# Patient Record
Sex: FEMALE | Race: WHITE
[De-identification: names, ages, dates, MRNs, and addresses within clinical notes are randomized per-mention and may not be internally consistent; named-entity substitution may affect disease eponyms.]

---

## 2017-12-20 ENCOUNTER — HOSPITAL ENCOUNTER (EMERGENCY)
Dept: HOSPITAL 42 - ED | Age: 72
Discharge: HOME | End: 2017-12-20
Payer: COMMERCIAL

## 2017-12-20 VITALS
HEART RATE: 69 BPM | RESPIRATION RATE: 18 BRPM | SYSTOLIC BLOOD PRESSURE: 153 MMHG | OXYGEN SATURATION: 98 % | DIASTOLIC BLOOD PRESSURE: 78 MMHG

## 2017-12-20 VITALS — TEMPERATURE: 98.1 F

## 2017-12-20 DIAGNOSIS — H57.8: Primary | ICD-10-CM

## 2017-12-20 NOTE — ED PDOC
Arrival/HPI





- General


Chief Complaint: Headache


Time Seen by Provider: 12/20/17 11:08


Historian: Patient, Family, Other (son translating )





- History of Present Illness


Narrative History of Present Illness (Text): 


you were here with your  and son and have a history of catarct surgery 

in both your eyes about 1 year ago, were treated in the ED today for pressure 

in both eyes and change in vision but otherwise without any pain or foreign 

body sensation in your eyes/nausea/vomiting/headache/dizziness/difficulty 

breathing/chest pain/abdomen pain/numbness/tingling/loss of limb function/pain 

with urination. 


12/20/17 12:15








Past Medical History





- Provider Review


Nursing Documentation Reviewed: Yes





- Travel History


Have you recently traveled outside [place] w/in the past 3 mons?: Yes





- Infectious Disease


Hx of Infectious Diseases: None





- Reproductive


Menopause: Yes





- Cardiac


Hx Hypertension: Yes





- Endocrine/Metabolic


Hx Diabetes Mellitus Type 1: Yes





- Psychiatric


Hx Substance Use: No





Family/Social History





- Physician Review


Nursing Documentation Reviewed: Yes


Family/Social History: No Known Family HX


Smoking Status: Never Smoked


Hx Alcohol Use: No


Hx Substance Use: No





Allergies/Home Meds


Allergies/Adverse Reactions: 


Allergies





No Known Allergies Allergy (Verified 10/07/17 14:34)


 








Home Medications: 


 Home Meds











 Medication  Instructions  Recorded  Confirmed


 


Insulin NPH Hum/Reg Insulin Hm 32 units SC ACB 10/07/17 12/20/17





[Humulin 70/30 70 U/ml-30 U/ml 10   





ml]   














Review of Systems





- Review of Systems


Constitutional: Normal


Eyes: Vision Changes, Photophobia, Other (pressure)


ENT: Normal


Respiratory: Normal


Cardiovascular: Normal


Gastrointestinal: Normal


Genitourinary Female: Normal


Musculoskeletal: Normal


Skin: Normal


Neurological: Normal


Endocrine: Normal


Hemo/Lymphatic: Normal


Psychiatric: Normal





Physical Exam


Vital Signs Reviewed: Yes





Vital Signs











  Temp Pulse Resp BP Pulse Ox


 


 12/20/17 11:14  98.1 F  72  16  155/82 H  97











Temperature: Afebrile


Blood Pressure: Hypertensive


Pulse: Regular


Respiratory Rate: Normal


Appearance: Positive for: Well-Appearing, Non-Toxic, Comfortable


Pain Distress: None


Mental Status: Positive for: Alert and Oriented X 3





- Systems Exam


Head: Present: Atraumatic, Normocephalic


Pupils: Present: PERRL, Other (b/l visual acuity 20/30, magnification without 

foriegn body, b/l pink eyelid, tonometry right 2, 4, 6, and right 1, 3, 4)


Extroacular Muscles: Present: EOMI


Conjunctiva: Present: Normal


Ears: Present: Normal


Mouth: Present: Moist Mucous Membranes


Pharnyx: Present: Normal


Nose (External): Present: Atraumatic


Nose (Internal): Present: Normal Inspection


Respiratory/Chest: Present: Clear to Auscultation, Good Air Exchange


Cardiovascular: Present: Regular Rate and Rhythm


Abdomen: No: Tenderness, Distention, Normal Bowel Sounds, Peritoneal Signs, 

Rebound, Guarding, McBurney's Point Tender, Rovsing's Sign Present, Hernias, 

Feeding Tubes, Ostomy Tubes, Mass/Organomegaly, Scars, Other


Back: Present: Normal Inspection


Upper Extremity: Present: Normal Inspection


Lower Extremity: Present: Normal Inspection


Neurological: Present: GCS=15, CN II-XII Intact, Speech Normal, Motor Func 

Grossly Intact, Gait Normal


Skin: Present: Warm, Normal Color


Psychiatric: Present: Alert, Oriented x 3, Normal Insight, Normal Concentration





Medical Decision Making


ED Course and Treatment: 


you were here with your  and son and have a history of catarct surgery 

in both your eyes about 1 year ago, were treated in the ED today for pressure 

in both eyes and change in vision but otherwise without any pain or foreign 

body sensation in your eyes/red eyes/nausea/vomiting/headache/dizziness/

difficulty breathing/chest pain/abdomen pain/numbness/tingling/loss of limb 

function/pain with urination. You were otherwise breathing easily, smiling with 

your son and , good strength/sensation, walking easily, clear lungs, no 

abdomen tenderness, both eyes vision 20/30, no foreign objects, pink eyelids 

and no high pressures in either eyes, no sign of eye infection, no fever temp 

98.1, stable heart rate 72, stable breathing rate 16, excellent oxygen level 100

% room air, elevated blood pressure 155/82 which we recommend repeat in 2-3 

days primary care office to determine further treatment, counselled to monitor 

vision for any worsening changes and thus discharged home with son and . 

1. Recommend follow-up primary care 1-2 days to review symptoms and referral to 

ophthalmology clinic to ensure further care. 4. If any worsening pain, fever, 

chills, nausea, vomiting, difficulty breathing, numbness, loss of limb function

, pain with urination or any medical condition then return to the ED.


12/20/17 12:23








Disposition/Present on Arrival





- Present on Arrival


Any Indicators Present on Arrival: No


History of DVT/PE: No


History of Uncontrolled Diabetes: No


Urinary Catheter: No


History of Decub. Ulcer: No


History Surgical Site Infection Following: None





- Disposition


Have Diagnosis and Disposition been Completed?: Yes


Diagnosis: 


 Bilateral eye complaint





Disposition: HOME/ ROUTINE


Disposition Time: 12:27


Patient Plan: Discharge


Condition: STABLE


Additional Instructions: 


you were here with your  and son and have a history of catarct surgery 

in both your eyes about 1 year ago, were treated in the ED today for pressure 

in both eyes and change in vision but otherwise without any pain or foreign 

body sensation in your eyes/red eyes/nausea/vomiting/headache/dizziness/

difficulty breathing/chest pain/abdomen pain/numbness/tingling/loss of limb 

function/pain with urination. You were otherwise breathing easily, smiling with 

your son and , good strength/sensation, walking easily, clear lungs, no 

abdomen tenderness, both eyes vision 20/30, no foreign objects, pink eyelids 

and no high pressures in either eyes, no sign of eye infection, no fever temp 

98.1, stable heart rate 72, stable breathing rate 16, excellent oxygen level 100

% room air, elevated blood pressure 155/82 which we recommend repeat in 2-3 

days primary care office to determine further treatment, counselled to monitor 

vision for any worsening changes and thus discharged home with son and . 

1. Recommend follow-up primary care 1-2 days to review symptoms and referral to 

ophthalmology clinic to ensure further care. 4. If any worsening pain, fever, 

chills, nausea, vomiting, difficulty breathing, numbness, loss of limb function

, pain with urination or any medical condition then return to the ED.


Referrals: 


[facility], [Primary Care Provider] - Follow up with primary


Leonardo You [Staff Provider] - Follow up with primary


Forms:  [employer] (English)

## 2019-04-19 ENCOUNTER — HOSPITAL ENCOUNTER (EMERGENCY)
Dept: HOSPITAL 42 - ED | Age: 74
Discharge: LEFT BEFORE DISCHARGE | End: 2019-04-19
Payer: SELF-PAY

## 2019-04-19 VITALS — SYSTOLIC BLOOD PRESSURE: 128 MMHG | TEMPERATURE: 98 F | DIASTOLIC BLOOD PRESSURE: 68 MMHG | OXYGEN SATURATION: 98 %

## 2019-04-19 VITALS — HEART RATE: 73 BPM | RESPIRATION RATE: 18 BRPM

## 2019-04-19 VITALS — BODY MASS INDEX: 32.1 KG/M2

## 2019-04-19 DIAGNOSIS — I10: ICD-10-CM

## 2019-04-19 DIAGNOSIS — E11.9: ICD-10-CM

## 2019-04-19 DIAGNOSIS — R55: ICD-10-CM

## 2019-04-19 DIAGNOSIS — R42: Primary | ICD-10-CM

## 2019-04-19 LAB
ALBUMIN SERPL-MCNC: 4.2 G/DL
ALBUMIN/GLOB SERPL: 1.2 {RATIO}
ALT SERPL-CCNC: 19 U/L
APTT BLD: 29.2 SECONDS
AST SERPL-CCNC: 45 U/L
BASOPHILS # BLD AUTO: 0.01 K/MM3
BASOPHILS NFR BLD: 0.1 %
BUN SERPL-MCNC: 33 MG/DL
CALCIUM SERPL-MCNC: 10.1 MG/DL
EOSINOPHIL # BLD: 0 10*3/UL
EOSINOPHIL NFR BLD: 0.1 %
ERYTHROCYTE [DISTWIDTH] IN BLOOD BY AUTOMATED COUNT: 15.7 %
GFR NON-AFRICAN AMERICAN: 24
HGB BLD-MCNC: 11.2 G/DL
INR PPP: 1.09
LIPASE SERPL-CCNC: 180 U/L
LYMPHOCYTES # BLD: 1.9 10*3/UL
LYMPHOCYTES NFR BLD AUTO: 25.5 %
MCH RBC QN AUTO: 26.7 PG
MCHC RBC AUTO-ENTMCNC: 32.2 G/DL
MCV RBC AUTO: 83.1 FL
MONOCYTES # BLD AUTO: 0.3 10*3/UL
MONOCYTES NFR BLD: 3.9 %
PLATELET # BLD: 307 10^3/UL
PMV BLD AUTO: 9.5 FL
PROTHROMBIN TIME: 12.1 SECONDS
RBC # BLD AUTO: 4.19 10^6/UL
TROPONIN I SERPL-MCNC: < 0.01 NG/ML
WBC # BLD AUTO: 7.4 10^3/UL

## 2019-04-19 NOTE — ED PDOC
Arrival/HPI





- General


Chief Complaint: Dizziness/Lightheaded


Time Seen by Provider: 04/19/19 20:08


Historian: Patient, Family





- History of Present Illness


Narrative History of Present Illness (Text): 





04/19/19 20:17


73 year old female, with past medical history of hypertension, diabetes, and 

cataract surgery, presents to emergency department for cough and dizziness since

earlier today. Family notes that patient passed out for "10-15 minutes 

yesterday" and began shaking today. Patient denies any fevers, headache, chest 

pain, shortness of breath, abdominal pain, nausea, vomiting, diarrhea, back 

pain, neck pain, or any other complaints. HPI limited, as patient is poor 

historian. 





04/19/19 23:22





Time/Duration: Other (today)


Symptom Onset: Gradual


Symptom Course: Unchanged


Activities at Onset: Light


Context: Home





Past Medical History





- Provider Review


Nursing Documentation Reviewed: Yes





- Infectious Disease


Hx of Infectious Diseases: None





- Cardiac


Hx Hypertension: Yes





- Endocrine/Metabolic


Hx Diabetes Mellitus Type 1: Yes


Hx Diabetes Mellitus Type 2: Yes





- Psychiatric


Hx Substance Use: No





- Anesthesia


Hx Anesthesia: No





Family/Social History





- Physician Review


Nursing Documentation Reviewed: Yes


Family/Social History: Unknown Family HX


Smoking Status: Never Smoked


Hx Alcohol Use: No


Hx Substance Use: No





Allergies/Home Meds


Allergies/Adverse Reactions: 


Allergies





No Known Allergies Allergy (Verified 10/07/17 14:34)


   








Home Medications: 


                                    Home Meds











 Medication  Instructions  Recorded  Confirmed


 


Insulin NPH Hum/Reg Insulin Hm 32 units SC ACB 10/07/17 12/20/17





[Humulin 70/30 70 U/ml-30 U/ml 10   





ml]   














Review of Systems





- Physician Review


All systems were reviewed & negative as marked: Yes





- Review of Systems


Constitutional: Other (shaking ).  absent: Fevers


Respiratory: Cough.  absent: SOB


Cardiovascular: absent: Chest Pain


Gastrointestinal: absent: Abdominal Pain, Diarrhea, Nausea, Vomiting


Genitourinary Female: absent: Urine Output Changes


Musculoskeletal: absent: Back Pain, Neck Pain


Skin: absent: Rash


Neurological: Dizziness.  absent: Headache





Physical Exam


Vital Signs Reviewed: Yes





Vital Signs











  Temp Pulse Resp BP Pulse Ox


 


 04/19/19 20:05  98.1 F  73  18  129/71  96











Temperature: Afebrile


Blood Pressure: Normal


Pulse: Regular


Respiratory Rate: Normal


Appearance: Positive for: Well-Appearing, Non-Toxic, Comfortable


Pain Distress: None


Mental Status: Positive for: Alert and Oriented X 3





- Systems Exam


Head: Present: Atraumatic, Normocephalic


Pupils: Present: PERRL


Extroacular Muscles: Present: EOMI


Conjunctiva: Present: Normal


Mouth: Present: Moist Mucous Membranes


Neck: Present: Normal Range of Motion


Respiratory/Chest: Present: Clear to Auscultation, Good Air Exchange.  No: 

Respiratory Distress, Accessory Muscle Use


Cardiovascular: Present: Regular Rate and Rhythm, Normal S1, S2.  No: Murmurs


Abdomen: No: Tenderness, Distention, Peritoneal Signs


Back: Present: Normal Inspection


Upper Extremity: Present: Normal Inspection.  No: Cyanosis, Edema


Lower Extremity: Present: Normal Inspection.  No: Edema


Neurological: Present: GCS=15, CN II-XII Intact, Speech Normal


Skin: Present: Warm, Dry, Normal Color.  No: Rashes


Psychiatric: Present: Alert, Oriented x 3, Normal Insight, Normal Concentration





Medical Decision Making


ED Course and Treatment: 





04/19/19 20:19


Impression:


73 year old female presents to emergency department for cough, dizziness, and 

shaking since earlier today. 





Plan:


-- CT Head


-- EKG


-- Labs


-- Chest X-ray


-- Urinalysis 


-- Reassess and disposition





Prior Visits:


Notes and results from previous visits were reviewed. 





Progress Notes:





EKG: Ordered, reviewed, and independently interpreted the EKG.


Rate :  72 BPM


Rhythm : NSR


Interpretation : No ST-segment elevations or depressions, no T-wave changes 





04/19/19 22:56


Leaving Against Medical Advice (AMA):





This patient is choosing to leave against medical advice.  I have personally 

explained to the patient that choosing to do so may result in permanent bodily 

harm or death.  I have discussed at great length that without further evaluation

and monitoring there may be unforeseen circumstances and/or deterioration 

causing permanent bodily harm or death as a result of their choice.  The patient

is alert, oriented, and shows the mental capacity to make clear decisions 

regarding the patients health care at this time. The patient continues to wish 

to leave against medical advice.  


In light of the patients decision to leave AMA, follow-up has been arranged and

the patient is aware of the importance of following up as instructed.  The 

patient has been advised that they should return to the ED immediately if they 

change their mind at any time, or if their condition begins to change or worsen 

in any way.





04/19/19 23:22


pt had reported syncope yesterday. in er tdoay, neuro intact, smiling innad. 

labs ct neg. i asked pt to be admitted and observed for syncope as etiology 

unclear. pt and son refuse. pt requests son for translation. sign ama





- RAD Interpretation


Radiology Orders: 











04/19/19 20:12


CHEST PORTABLE [RAD] Stat 














- Scribe Statement


The provider has reviewed the documentation as recorded by the Scribe





Jose Painter





All medical record entries made by the Scribe were at my direction and 

personally dictated by me. I have reviewed the chart and agree that the record 

accurately reflects my personal performance of the history, physical exam, 

medical decision making, and the department course for this patient. I have also

 personally directed, reviewed, and agree with the discharge instructions and 

disposition. 








Disposition/Present on Arrival





- Present on Arrival


Any Indicators Present on Arrival: No


History of DVT/PE: No


History of Uncontrolled Diabetes: No


Urinary Catheter: No


History of Decub. Ulcer: No


History Surgical Site Infection Following: None





- Disposition


Have Diagnosis and Disposition been Completed?: Yes


Diagnosis: 


 Syncope, Dizziness





Disposition: AGAINST MEDICAL ADVICE


Disposition Time: 20:00


Patient Problems: 


                             Current Active Problems











Problem Status Onset


 


Dizziness Acute 


 


Syncope Acute 











Condition: UNKNOWN


Discharge Instructions (ExitCare):  Syncope (Fainting), Leaving Against Medical 

Advice, Syncope (ED)


Additional Instructions: 


return to er with worsening symptoms or concerns.


Referrals: 


 Service [Outside] - Follow up with primary


[facility] [Outside] - Follow up with primary


[facility] [Outside] - Follow up with primary


Forms:  [employer] (English)

## 2019-04-20 NOTE — CARD
--------------- APPROVED REPORT --------------





Date of service: 04/19/2019



EKG Measurement

Heart [id number]

[place] [id number]

[id number]

[id number]

[id number]



<Conclusion>

*** Poor data quality, interpretation may be adversely affected

Normal sinus rhythm with sinus arrhythmia

Normal ECG

## 2019-04-20 NOTE — CT
Date of service: 



04/19/2019



PROCEDURE:  CT HEAD WITHOUT CONTRAST.



HISTORY:

syncope



COMPARISON:

10/07/2017



TECHNIQUE:

Axial computed tomography images were obtained through the head/brain 

without intravenous contrast.  



Radiation dose:



Total exam DLP = 823.2 mGy-cm.



This CT exam was performed using one or more of the following dose 

reduction techniques: Automated exposure control, adjustment of the 

mA and/or kV according to patient size, and/or use of iterative 

reconstruction technique.



FINDINGS:



HEMORRHAGE:

No intracranial hemorrhage. 



BRAIN:

No mass effect or edema.  No atrophy or chronic microvascular 

ischemic changes.



VENTRICLES:

Unremarkable. No hydrocephalus. 



CALVARIUM:

Unremarkable.



PARANASAL SINUSES:

Unremarkable as visualized. No significant inflammatory changes.



MASTOID AIR CELLS:

Unremarkable as visualized. No inflammatory changes.



OTHER FINDINGS:

The report concurs with the preliminary [facility] report



IMPRESSION:

No acute intracranial findings

## 2019-04-20 NOTE — RAD
Date of service: 



04/19/2019



HISTORY:

 syncope 



COMPARISON:

10/07/2017 



TECHNIQUE:

1 view obtained.



FINDINGS:



LUNGS:

No active pulmonary disease.



PLEURA:

No significant pleural effusion identified, no pneumothorax apparent.



CARDIOVASCULAR:

Aortic calcification



Mild cardiomegaly no pulmonary vascular congestion. 



OSSEOUS STRUCTURES:

No significant abnormalities.



VISUALIZED UPPER ABDOMEN:

Normal.



OTHER FINDINGS:

None.



IMPRESSION:

No active disease.